# Patient Record
Sex: MALE | Race: WHITE
[De-identification: names, ages, dates, MRNs, and addresses within clinical notes are randomized per-mention and may not be internally consistent; named-entity substitution may affect disease eponyms.]

---

## 2018-04-19 ENCOUNTER — HOSPITAL ENCOUNTER (OUTPATIENT)
Dept: HOSPITAL 45 - C.RDSM | Age: 23
Discharge: HOME | End: 2018-04-19
Attending: ORTHOPAEDIC SURGERY
Payer: COMMERCIAL

## 2018-04-19 DIAGNOSIS — S49.90XA: Primary | ICD-10-CM
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
## 2018-04-29 ENCOUNTER — HOSPITAL ENCOUNTER (EMERGENCY)
Dept: HOSPITAL 45 - C.EDC | Age: 23
LOS: 1 days | Discharge: HOME | End: 2018-04-30
Payer: COMMERCIAL

## 2018-04-29 VITALS
HEIGHT: 70.98 IN | WEIGHT: 229.94 LBS | BODY MASS INDEX: 32.19 KG/M2 | HEIGHT: 70.98 IN | BODY MASS INDEX: 32.19 KG/M2 | WEIGHT: 229.94 LBS

## 2018-04-29 VITALS — TEMPERATURE: 98.06 F

## 2018-04-29 DIAGNOSIS — Z88.8: ICD-10-CM

## 2018-04-29 DIAGNOSIS — R07.9: Primary | ICD-10-CM

## 2018-04-30 VITALS — HEART RATE: 63 BPM | OXYGEN SATURATION: 96 % | DIASTOLIC BLOOD PRESSURE: 59 MMHG | SYSTOLIC BLOOD PRESSURE: 142 MMHG

## 2018-04-30 LAB
BASOPHILS # BLD: 0.08 K/UL (ref 0–0.2)
BASOPHILS NFR BLD: 0.7 %
BUN SERPL-MCNC: 15 MG/DL (ref 7–18)
CALCIUM SERPL-MCNC: 8.4 MG/DL (ref 8.5–10.1)
CO2 SERPL-SCNC: 28 MMOL/L (ref 21–32)
CREAT SERPL-MCNC: 1 MG/DL (ref 0.6–1.4)
EOS ABS #: 0.18 K/UL (ref 0–0.5)
EOSINOPHIL NFR BLD AUTO: 351 K/UL (ref 130–400)
GLUCOSE SERPL-MCNC: 91 MG/DL (ref 70–99)
HCT VFR BLD CALC: 45.7 % (ref 42–52)
HGB BLD-MCNC: 16.1 G/DL (ref 14–18)
IG#: 0.02 K/UL (ref 0–0.02)
IMM GRANULOCYTES NFR BLD AUTO: 38.5 %
INR PPP: 1 (ref 0.9–1.1)
LYMPHOCYTES # BLD: 4.74 K/UL (ref 1.2–3.4)
MCH RBC QN AUTO: 31.6 PG (ref 25–34)
MCHC RBC AUTO-ENTMCNC: 35.2 G/DL (ref 32–36)
MCV RBC AUTO: 89.6 FL (ref 80–100)
MONO ABS #: 1.67 K/UL (ref 0.11–0.59)
MONOCYTES NFR BLD: 13.6 %
NEUT ABS #: 5.61 K/UL (ref 1.4–6.5)
NEUTROPHILS # BLD AUTO: 1.5 %
NEUTROPHILS NFR BLD AUTO: 45.5 %
PMV BLD AUTO: 9.9 FL (ref 7.4–10.4)
POTASSIUM SERPL-SCNC: 3.9 MMOL/L (ref 3.5–5.1)
PTT PATIENT: 28.8 SECONDS (ref 21–31)
RED CELL DISTRIBUTION WIDTH CV: 13.7 % (ref 11.5–14.5)
RED CELL DISTRIBUTION WIDTH SD: 45.3 FL (ref 36.4–46.3)
SODIUM SERPL-SCNC: 139 MMOL/L (ref 136–145)
WBC # BLD AUTO: 12.3 K/UL (ref 4.8–10.8)

## 2018-04-30 NOTE — EMERGENCY ROOM VISIT NOTE
History


Report prepared by Daya:  Erickson Huff


Under the Supervision of:  Dr. Bran Rasmussen M.D.


First contact with patient:  23:54


Chief Complaint:  CHEST PAIN


Stated Complaint:  CHEST PAIN





History of Present Illness


The patient is a 22 year old male who presents to the Emergency Room with 

complaints of heavy chest pain beginning 40 minutes ago which lasted for 

roughly 20 minutes. The patient describes the pain as sharp and notes that it 

does not radiate anywhere. He states that he was drinking alcohol when the pain 

began. The patient notes experiencing similar chest tightness 1 week ago which 

resolved 3 days ago. The patient denies any swelling or pain in his legs, 

recent long distance travel, family history of clots, cocaine or amphetamine use

, abdominal pain, vomiting, fevers, or a history of panic attacks or anxiety. 

He notes a history of hypertension.  He initially stated that his chest pain 

had resolved but states that he is still has a little bit of tightness.





   Source of History:  patient


   Onset:  40 minutes ago


   Position:  chest


   Symptom Intensity:  severe


   Quality:  sharp, other (heavy )


   Timing:  resolved


   Modifying Factors (Worsening):  other (none )


   Modifying Factors (Relieving):  other (none )


   Associated Symptoms:  No fevers, No vomiting, No abdominal pain


Note:


Denies: Swelling or pain in legs, history of anxiety.





Review of Systems


See HPI for pertinent positives & negatives. A total of 10 systems reviewed and 

were otherwise negative.





Past Medical & Surgical


No significant past medical history





Family History





Patient reports no known family medical history.





Social History


Alcohol Use:  occasionally


Drug Use:  none


Marital Status:  single


Housing Status:  lives with roommate


Occupation Status:  [employer] student





Current/Historical Medications


No Active Prescriptions or Reported Meds





Allergies


Coded Allergies:  


     Monosodium Glutamate (Verified  Allergy, Unknown, unknown, 4/30/18)


     Sodium Nitrate (Verified  Allergy, Unknown, unknown, 4/30/18)





Physical Exam


Vital Signs











  Date Time  Temp Pulse Resp B/P (MAP) Pulse Ox O2 Delivery O2 Flow Rate FiO2


 


4/30/18 01:08  63 16 142/59 96 Room Air  


 


4/30/18 00:05  67      


 


4/29/18 23:40     97 Room Air  


 


4/29/18 23:40 36.7 66 16 144/85 97 Room Air  











Physical Exam


Constitutional: Vital signs reviewed. Slightly anxious. 


Eyes: Pupils are equal round reactive to light.  Conjunctiva are noninjected.  


ENT: Pharynx is clear without erythema or exudate.  Mucous membranes are moist.

  Neck supple without meningeal signs.


Respiratory: Clear to auscultation bilaterally.  Breath sounds are equal 

bilaterally. 


Cardiovascular: Regular rate and rhythm.  No rubs or gallops.


GI: Soft, nondistended and nontender.  Bowel sounds are present.


Musculoskeletal: No peripheral edema.  No lower extremity tenderness. 


Integumentary: No cyanosis.


Neurological: The patient is awake and alert.  No focal deficits.


Psychiatric: Anxious appearing.





Medical Decision & Procedures


ER Provider


Diagnostic Interpretation:


Radiology results as stated below per my review :





CHEST X-RAY:





No acute cardiopulmonary process. No consolidation.





Laboratory Results


4/29/18 23:52








Red Blood Count 5.10, Mean Corpuscular Volume 89.6, Mean Corpuscular Hemoglobin 

31.6, Mean Corpuscular Hemoglobin Concent 35.2, Mean Platelet Volume 9.9, 

Neutrophils (%) (Auto) 45.5, Lymphocytes (%) (Auto) 38.5, Monocytes (%) (Auto) 

13.6, Eosinophils (%) (Auto) 1.5, Basophils (%) (Auto) 0.7, Neutrophils # (Auto

) 5.61, Lymphocytes # (Auto) 4.74, Monocytes # (Auto) 1.67, Eosinophils # (Auto

) 0.18, Basophils # (Auto) 0.08





4/29/18 23:52

















Test


  4/29/18


23:52 4/30/18


00:22


 


White Blood Count


  12.30 K/uL


(4.8-10.8) 


 


 


Red Blood Count


  5.10 M/uL


(4.7-6.1) 


 


 


Hemoglobin


  16.1 g/dL


(14.0-18.0) 


 


 


Hematocrit 45.7 % (42-52)  


 


Mean Corpuscular Volume


  89.6 fL


() 


 


 


Mean Corpuscular Hemoglobin


  31.6 pg


(25-34) 


 


 


Mean Corpuscular Hemoglobin


Concent 35.2 g/dl


(32-36) 


 


 


Platelet Count


  351 K/uL


(130-400) 


 


 


Mean Platelet Volume


  9.9 fL


(7.4-10.4) 


 


 


Neutrophils (%) (Auto) 45.5 %  


 


Lymphocytes (%) (Auto) 38.5 %  


 


Monocytes (%) (Auto) 13.6 %  


 


Eosinophils (%) (Auto) 1.5 %  


 


Basophils (%) (Auto) 0.7 %  


 


Neutrophils # (Auto)


  5.61 K/uL


(1.4-6.5) 


 


 


Lymphocytes # (Auto)


  4.74 K/uL


(1.2-3.4) 


 


 


Monocytes # (Auto)


  1.67 K/uL


(0.11-0.59) 


 


 


Eosinophils # (Auto)


  0.18 K/uL


(0-0.5) 


 


 


Basophils # (Auto)


  0.08 K/uL


(0-0.2) 


 


 


RDW Standard Deviation


  45.3 fL


(36.4-46.3) 


 


 


RDW Coefficient of Variation


  13.7 %


(11.5-14.5) 


 


 


Immature Granulocyte % (Auto) 0.2 %  


 


Immature Granulocyte # (Auto)


  0.02 K/uL


(0.00-0.02) 


 


 


Red Blood Cell Morphology Unremarkable  


 


Prothrombin Time


  10.2 SECONDS


(9.0-12.0) 


 


 


Prothromb Time International


Ratio 1.0 (0.9-1.1) 


  


 


 


Activated Partial


Thromboplast Time 28.8 SECONDS


(21.0-31.0) 


 


 


Partial Thromboplastin Ratio 1.1  


 


Anion Gap


  7.0 mmol/L


(3-11) 


 


 


Est Creatinine Clear Calc


Drug Dose 142.4 ml/min 


  


 


 


Estimated GFR (


American) 123.3 


  


 


 


Estimated GFR (Non-


American 106.4 


  


 


 


BUN/Creatinine Ratio 15.5 (10-20)  


 


Calcium Level


  8.4 mg/dl


(8.5-10.1) 


 


 


Bedside D-Dimer


  


  142 ng/mlFEU


(0-450)


 


Bedside Troponin I


  


  < 0.030 ng/ml


(0-0.045)











ECG Per My Interpretation


Indication:  chest pain


Rate (beats per minute):  55


Rhythm:  sinus bradycardia


Findings:  other (QRS 120ms, early repolarization, no PVCs)





ED Course


9637: The patient was evaluated in room C6. A complete history and physical 

exam was performed.





0000: Ordered Lorazepam 1mg SL. 





0114: I reevaluated the patient, his chest pain is now gone. I discussed his 

test results with him.





0130: Upon reevaluation, the patient appeared to have improvement of his 

symptoms. I discussed tonight's findings with him. He verbalized agreement of 

the treatment plan. He was discharged home.





Medical Decision


This is a 22-year-old male presents with chest pain.  Differential diagnosis 

includes GERD, pleurisy, pulmonary embolism, pneumothorax, anxiety.  I did 

perform a limited focused review of portions of the patient's old chart on the 

electronic medical record. The patient has had no recent pertinent visits to 

this hospital.





I did evaluate the patient as noted above.  IV access was established.  The 

patient was placed on a continuous cardiac monitor.  I did order and personally 

review the patient's 12-lead EKG and chest x-ray as described above.  He has no 

acute ischemic changes on his twelve-lead EKG.  Chest x-ray is unremarkable.  I 

did order and review the patient's blood work as noted in the electronic 

medical record.  Troponin and d-dimer are negative.  I did treat patient with 

Ativan 1 mg sublingually.  I did reassess patient.  He states his chest 

discomfort is gone.  He feels much better.  I did discuss the test results with 

him.  I did recommend close follow-up with his doctor.  He was discharged in 

good condition.  He was advised to avoid alcohol in case he does have some 

reflux.





Medication Reconcilliation


Current Medication List:  was personally reviewed by me





Blood Pressure Screening


Patient's blood pressure:  Elevated blood pressure


Blood pressure disposition:  Referred to PCP


The patient is hypertensive.





Impression





 Primary Impression:  


 Acute chest pain





Scribe Attestation


The scribe's documentation has been prepared under my direct and personally 

reviewed by me in its entirety. I confirm that the note above accurately 

reflects all work, treatment, procedures, and medical decision making performed 

by me.





Departure Information


Dispostion


Home / Self-Care





Prescriptions





No Active Prescriptions or Reported Meds





Referrals


No Doctor, Assigned (PCP)





Forms


HOME CARE DOCUMENTATION FORM,                                                 

               IMPORTANT VISIT INFORMATION





Patient Instructions


ED Chest Pain Atypical Unkn Cause, My [facility]





Additional Instructions





You have been examined and treated today on an emergency basis only. This is 

not a substitute for, or an effort to provide, complete comprehensive medical 

care. It is impossible to recognize and treat all injuries or illnesses in a 

single emergency department visit. It is therefore important that you follow up 

closely with your physician.  Call as soon as possible for an appointment.  

Return for worsening symptoms or if you develop fever, vomiting, or any other 

concerning symptoms.

## 2018-04-30 NOTE — DIAGNOSTIC IMAGING REPORT
CHEST 2 VIEWS ROUTINE



HISTORY:  22 years-old Male eval for pna acute atypical chest pain with cough



COMPARISON: None available



TECHNIQUE: PA and lateral views of the chest



FINDINGS: 

Cardiomediastinal and hilar silhouettes are within normal limits. There is no

pneumothorax, pleural effusion, focal airspace consolidation or overt pulmonary

edema. The bones of the chest appear grossly intact. There is slight convex

right curvature of the midthoracic spine. Bones appear grossly intact.



IMPRESSION: No acute process. 







The above report was generated using voice recognition software. It may contain

grammatical, syntax or spelling errors.







Electronically signed by:  Jeremi Hall M.D.

4/30/2018 6:31 AM



Dictated Date/Time:  4/30/2018 6:30 AM